# Patient Record
Sex: FEMALE | URBAN - METROPOLITAN AREA
[De-identification: names, ages, dates, MRNs, and addresses within clinical notes are randomized per-mention and may not be internally consistent; named-entity substitution may affect disease eponyms.]

---

## 2023-07-05 ENCOUNTER — APPOINTMENT (RX ONLY)
Dept: URBAN - METROPOLITAN AREA CLINIC 33 | Facility: CLINIC | Age: 33
Setting detail: DERMATOLOGY
End: 2023-07-05

## 2023-07-05 VITALS
DIASTOLIC BLOOD PRESSURE: 77 MMHG | SYSTOLIC BLOOD PRESSURE: 112 MMHG | WEIGHT: 156 LBS | HEIGHT: 64 IN | HEART RATE: 81 BPM

## 2023-07-05 DIAGNOSIS — L92.0 GRANULOMA ANNULARE: ICD-10-CM

## 2023-07-05 PROCEDURE — 99202 OFFICE O/P NEW SF 15 MIN: CPT

## 2023-07-05 PROCEDURE — ? COUNSELING

## 2023-07-05 ASSESSMENT — LOCATION DETAILED DESCRIPTION DERM
LOCATION DETAILED: RIGHT ELBOW
LOCATION DETAILED: LEFT PROXIMAL DORSAL FOREARM

## 2023-07-05 ASSESSMENT — LOCATION ZONE DERM: LOCATION ZONE: ARM

## 2023-07-05 ASSESSMENT — LOCATION SIMPLE DESCRIPTION DERM
LOCATION SIMPLE: RIGHT ELBOW
LOCATION SIMPLE: LEFT FOREARM

## 2023-07-05 ASSESSMENT — SEVERITY ASSESSMENT: SEVERITY: MILD

## 2023-07-05 NOTE — HPI: RASH
What Type Of Note Output Would You Prefer (Optional)?: Bullet Format
Is The Patient Presenting As Previously Scheduled?: Yes
How Severe Is Your Rash?: mild
Is This A New Presentation, Or A Follow-Up?: Referral for Rash
Who Is Your Referring Provider?: Kym Orozco

## 2023-07-05 NOTE — PROCEDURE: COUNSELING
Patient Specific Counseling (Will Not Stick From Patient To Patient): BSA <0.5%\\n\\nPatient developed these about 6 months ago starting as a small bump which has expanded.  these have no pruritus associated with them and no other indication of this on other sites. \\n\\nShe did recall having her vaccines prior to erupting - which may or may not correlate to an immune response.  This presents as thinning out and resolving granuloma annulare.\\n\\nGranuloma annulare is a benign condition. The cause of the eruption is unknown.  It looks like ring worm and is often confused with Tinea corporis. This skin condition is not infectious therefore it is not contagious. Treatments range from high potency topical steroids to injection of steroids. Light therapy has be shown to be effective in diffuse cases as well as niacinamide. 5 mg of Kenalog  injected into perimeter can prove helpful in some cases\\n\\nThis is not symptomatic for her and appears to be burning out.  We reveiwed options including topical versus injectable kenalog.  After much discussion we is going to let it run its course at this time.  If this changes she can return to clinic.  Also suggest if she notes more - contact the clinic earlier and we can re-evaluate and treat before they get to this stage of the process \\n\\nHandout given and she had no further questions at this time.
Detail Level: Simple